# Patient Record
Sex: MALE | Race: WHITE | Employment: OTHER | URBAN - METROPOLITAN AREA
[De-identification: names, ages, dates, MRNs, and addresses within clinical notes are randomized per-mention and may not be internally consistent; named-entity substitution may affect disease eponyms.]

---

## 2017-03-15 ENCOUNTER — TELEPHONE (OUTPATIENT)
Dept: ORTHOPEDIC SURGERY | Age: 72
End: 2017-03-15

## 2017-03-15 ENCOUNTER — OFFICE VISIT (OUTPATIENT)
Dept: ORTHOPEDIC SURGERY | Age: 72
End: 2017-03-15
Payer: MEDICARE

## 2017-03-15 VITALS
HEART RATE: 99 BPM | WEIGHT: 200 LBS | SYSTOLIC BLOOD PRESSURE: 155 MMHG | BODY MASS INDEX: 28.63 KG/M2 | DIASTOLIC BLOOD PRESSURE: 107 MMHG | HEIGHT: 70 IN

## 2017-03-15 DIAGNOSIS — M16.11 ARTHRITIS OF RIGHT HIP: ICD-10-CM

## 2017-03-15 DIAGNOSIS — M25.551 RIGHT HIP PAIN: Primary | ICD-10-CM

## 2017-03-15 PROCEDURE — 3017F COLORECTAL CA SCREEN DOC REV: CPT | Performed by: ORTHOPAEDIC SURGERY

## 2017-03-15 PROCEDURE — G8427 DOCREV CUR MEDS BY ELIG CLIN: HCPCS | Performed by: ORTHOPAEDIC SURGERY

## 2017-03-15 PROCEDURE — 4004F PT TOBACCO SCREEN RCVD TLK: CPT | Performed by: ORTHOPAEDIC SURGERY

## 2017-03-15 PROCEDURE — G8484 FLU IMMUNIZE NO ADMIN: HCPCS | Performed by: ORTHOPAEDIC SURGERY

## 2017-03-15 PROCEDURE — 4040F PNEUMOC VAC/ADMIN/RCVD: CPT | Performed by: ORTHOPAEDIC SURGERY

## 2017-03-15 PROCEDURE — 1123F ACP DISCUSS/DSCN MKR DOCD: CPT | Performed by: ORTHOPAEDIC SURGERY

## 2017-03-15 PROCEDURE — G8420 CALC BMI NORM PARAMETERS: HCPCS | Performed by: ORTHOPAEDIC SURGERY

## 2017-03-15 PROCEDURE — 99213 OFFICE O/P EST LOW 20 MIN: CPT | Performed by: ORTHOPAEDIC SURGERY

## 2017-03-15 RX ORDER — MELOXICAM 15 MG/1
15 TABLET ORAL DAILY
Qty: 30 TABLET | Refills: 3 | Status: SHIPPED | OUTPATIENT
Start: 2017-03-15 | End: 2017-11-01 | Stop reason: ALTCHOICE

## 2017-04-13 ENCOUNTER — TELEPHONE (OUTPATIENT)
Dept: ORTHOPEDIC SURGERY | Age: 72
End: 2017-04-13

## 2017-05-04 ENCOUNTER — TELEPHONE (OUTPATIENT)
Dept: ORTHOPEDIC SURGERY | Age: 72
End: 2017-05-04

## 2017-05-12 ENCOUNTER — OFFICE VISIT (OUTPATIENT)
Dept: ORTHOPEDIC SURGERY | Age: 72
End: 2017-05-12
Payer: MEDICARE

## 2017-05-12 DIAGNOSIS — M25.551 RIGHT HIP PAIN: Primary | ICD-10-CM

## 2017-05-12 PROCEDURE — G8420 CALC BMI NORM PARAMETERS: HCPCS | Performed by: ORTHOPAEDIC SURGERY

## 2017-05-12 PROCEDURE — 1123F ACP DISCUSS/DSCN MKR DOCD: CPT | Performed by: ORTHOPAEDIC SURGERY

## 2017-05-12 PROCEDURE — 99213 OFFICE O/P EST LOW 20 MIN: CPT | Performed by: ORTHOPAEDIC SURGERY

## 2017-05-12 PROCEDURE — 3017F COLORECTAL CA SCREEN DOC REV: CPT | Performed by: ORTHOPAEDIC SURGERY

## 2017-05-12 PROCEDURE — G8427 DOCREV CUR MEDS BY ELIG CLIN: HCPCS | Performed by: ORTHOPAEDIC SURGERY

## 2017-05-12 PROCEDURE — 4004F PT TOBACCO SCREEN RCVD TLK: CPT | Performed by: ORTHOPAEDIC SURGERY

## 2017-05-12 PROCEDURE — 4040F PNEUMOC VAC/ADMIN/RCVD: CPT | Performed by: ORTHOPAEDIC SURGERY

## 2017-06-12 ENCOUNTER — OFFICE VISIT (OUTPATIENT)
Dept: ORTHOPEDIC SURGERY | Age: 72
End: 2017-06-12
Payer: MEDICARE

## 2017-06-12 DIAGNOSIS — M25.551 RIGHT HIP PAIN: Primary | ICD-10-CM

## 2017-06-12 PROCEDURE — G8427 DOCREV CUR MEDS BY ELIG CLIN: HCPCS | Performed by: ORTHOPAEDIC SURGERY

## 2017-06-12 PROCEDURE — G8419 CALC BMI OUT NRM PARAM NOF/U: HCPCS | Performed by: ORTHOPAEDIC SURGERY

## 2017-06-12 PROCEDURE — 3017F COLORECTAL CA SCREEN DOC REV: CPT | Performed by: ORTHOPAEDIC SURGERY

## 2017-06-12 PROCEDURE — 99212 OFFICE O/P EST SF 10 MIN: CPT | Performed by: ORTHOPAEDIC SURGERY

## 2017-06-12 PROCEDURE — 4040F PNEUMOC VAC/ADMIN/RCVD: CPT | Performed by: ORTHOPAEDIC SURGERY

## 2017-06-12 PROCEDURE — 1123F ACP DISCUSS/DSCN MKR DOCD: CPT | Performed by: ORTHOPAEDIC SURGERY

## 2017-06-12 PROCEDURE — 4004F PT TOBACCO SCREEN RCVD TLK: CPT | Performed by: ORTHOPAEDIC SURGERY

## 2017-09-13 ENCOUNTER — OFFICE VISIT (OUTPATIENT)
Dept: ORTHOPEDIC SURGERY | Age: 72
End: 2017-09-13
Payer: MEDICARE

## 2017-09-13 VITALS
SYSTOLIC BLOOD PRESSURE: 138 MMHG | BODY MASS INDEX: 27.49 KG/M2 | HEART RATE: 92 BPM | WEIGHT: 192 LBS | HEIGHT: 70 IN | DIASTOLIC BLOOD PRESSURE: 82 MMHG

## 2017-09-13 DIAGNOSIS — M25.551 RIGHT HIP PAIN: Primary | ICD-10-CM

## 2017-09-13 PROCEDURE — 4040F PNEUMOC VAC/ADMIN/RCVD: CPT | Performed by: ORTHOPAEDIC SURGERY

## 2017-09-13 PROCEDURE — 3017F COLORECTAL CA SCREEN DOC REV: CPT | Performed by: ORTHOPAEDIC SURGERY

## 2017-09-13 PROCEDURE — G8419 CALC BMI OUT NRM PARAM NOF/U: HCPCS | Performed by: ORTHOPAEDIC SURGERY

## 2017-09-13 PROCEDURE — 99213 OFFICE O/P EST LOW 20 MIN: CPT | Performed by: ORTHOPAEDIC SURGERY

## 2017-09-13 PROCEDURE — 4004F PT TOBACCO SCREEN RCVD TLK: CPT | Performed by: ORTHOPAEDIC SURGERY

## 2017-09-13 PROCEDURE — 1123F ACP DISCUSS/DSCN MKR DOCD: CPT | Performed by: ORTHOPAEDIC SURGERY

## 2017-09-13 PROCEDURE — G8427 DOCREV CUR MEDS BY ELIG CLIN: HCPCS | Performed by: ORTHOPAEDIC SURGERY

## 2017-09-13 RX ORDER — GLIMEPIRIDE 1 MG/1
1 TABLET ORAL
COMMUNITY

## 2017-09-21 RX ORDER — HYDROCODONE BITARTRATE AND ACETAMINOPHEN 5; 325 MG/1; MG/1
TABLET ORAL
Qty: 40 TABLET | Refills: 0 | Status: SHIPPED | OUTPATIENT
Start: 2017-09-21 | End: 2017-09-28 | Stop reason: SDUPTHER

## 2017-09-28 RX ORDER — HYDROCODONE BITARTRATE AND ACETAMINOPHEN 5; 325 MG/1; MG/1
TABLET ORAL
Qty: 40 TABLET | Refills: 0 | Status: SHIPPED | OUTPATIENT
Start: 2017-09-28 | End: 2017-10-06 | Stop reason: SDUPTHER

## 2017-10-06 DIAGNOSIS — M16.11 PRIMARY OSTEOARTHRITIS OF RIGHT HIP: Primary | ICD-10-CM

## 2017-10-06 RX ORDER — HYDROCODONE BITARTRATE AND ACETAMINOPHEN 5; 325 MG/1; MG/1
TABLET ORAL
Qty: 40 TABLET | Refills: 0 | Status: SHIPPED | OUTPATIENT
Start: 2017-10-06 | End: 2017-10-26 | Stop reason: SDUPTHER

## 2017-10-20 ENCOUNTER — TELEPHONE (OUTPATIENT)
Dept: ORTHOPEDIC SURGERY | Age: 72
End: 2017-10-20

## 2017-10-26 DIAGNOSIS — M16.11 PRIMARY OSTEOARTHRITIS OF RIGHT HIP: Primary | ICD-10-CM

## 2017-10-26 RX ORDER — HYDROCODONE BITARTRATE AND ACETAMINOPHEN 5; 325 MG/1; MG/1
TABLET ORAL
Qty: 40 TABLET | Refills: 0 | Status: SHIPPED | OUTPATIENT
Start: 2017-10-26 | End: 2017-12-13 | Stop reason: ALTCHOICE

## 2017-11-01 ENCOUNTER — OFFICE VISIT (OUTPATIENT)
Dept: ORTHOPEDIC SURGERY | Age: 72
End: 2017-11-01

## 2017-11-01 DIAGNOSIS — Z96.641 STATUS POST TOTAL REPLACEMENT OF RIGHT HIP: Primary | ICD-10-CM

## 2017-11-01 PROCEDURE — 99024 POSTOP FOLLOW-UP VISIT: CPT | Performed by: ORTHOPAEDIC SURGERY

## 2017-11-01 RX ORDER — ASPIRIN 325 MG
325 TABLET ORAL
COMMUNITY

## 2017-11-01 NOTE — PROGRESS NOTES
Ulises Harrell M.D.            [address] [place] [address]., [address] [facility], [address] [facility]             Dept Phone: [phone]             Dept Fax:  [phone]  Lorenso Crigler returns today status post right total hip ASI on 10/19/17. He states his hip feels great. Examination his right hip wound is pristine. No redness or drainage. 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 Leg lengths are equal.  No calf tenderness negative Homans. No pain or rotation of his hip    X-rays taken today reviewed and reviewed by me show AP the pelvis the lateral the right hip. Prosthesis looks excellent in both views. Previous left total hip arthroplasty noted    Impression  Status post right total hip ASI ×2 weeks doing great  Previous left total hip 8 years doing well    Plan  Patient is very happy with how it feels. He's familiar with exercises. He can do his own program as he does go to the gym. Given some precautions. Back here in 6 weeks'          Review of Systems   Constitutional: Negative. HENT: Negative. Respiratory: Negative. Cardiovascular: Negative. Musculoskeletal: Negative. Neurological: Negative.          Past Medical History:   Diagnosis Date    Cancer [facility])     prostate    Cataract     Diabetes ([facility]     Hypertension      Past Surgical History:   Procedure Laterality Date    BICEPS TENDON REPAIR Left 2011    CARPAL TUNNEL RELEASE      CATARACT REMOVAL WITH IMPLANT Left 1977    COLONOSCOPY  2010    EYE SURGERY      from accident    HAND SURGERY Left 2001    has 4 screws into wrist area-carpal tunnel done same time    JOINT REPLACEMENT Left 2009    total left hip    KNEE ARTHROSCOPY      PROSTATE SURGERY  2004    prostate removal due to cancer    SHOULDER ARTHROSCOPY       Family History   Problem Relation Age of Onset    Cancer Father     Arthritis Brother     Cancer Brother     Cancer Brother            Orders Placed This

## 2017-12-13 ENCOUNTER — OFFICE VISIT (OUTPATIENT)
Dept: ORTHOPEDIC SURGERY | Age: 72
End: 2017-12-13

## 2017-12-13 DIAGNOSIS — Z96.641 STATUS POST TOTAL REPLACEMENT OF RIGHT HIP: Primary | ICD-10-CM

## 2017-12-13 PROCEDURE — 99024 POSTOP FOLLOW-UP VISIT: CPT | Performed by: ORTHOPAEDIC SURGERY

## 2017-12-13 NOTE — PROGRESS NOTES
Kandy Carpenter M.D.            [facility] [address] [facility],[address], [address] [facility]             Dept Phone: [phone]             Dept Fax:  [phone]  Kadi Velasquez returns today he is 8 weeks status post right total hip ASI. Patient states he has basically very little pain. He states that the first few steps when he gets out of a chair or little bit rough but once he gets going he has no pain whatsoever. He's been doing his elliptical as well without any limitation. Her graft examination notes a mild meralgia paresthetica. His leg lengths are equal.  He can do single stance. Once he gets up and walking he has a normal gait. No new x-rays taken today    Impression  8 weeks status post right total hip ASI doing very well    Plan  Patient is doing his own exercises and doing very well. We'll see him back here in 4 months. Call any problems prior to that time          Review of Systems   Constitutional: Negative. HENT: Negative. Respiratory: Negative. Cardiovascular: Negative. Musculoskeletal: Negative. Neurological: Negative. Past Medical History:   Diagnosis Date    Cancer [facility])     prostate    Cataract     Diabetes ([facility] [address]     Hypertension      Past Surgical History:   Procedure Laterality Date    BICEPS TENDON REPAIR Left 2011    CARPAL TUNNEL RELEASE      CATARACT REMOVAL WITH IMPLANT Left 1977    COLONOSCOPY  2010    EYE SURGERY      from accident    HAND SURGERY Left 2001    has 4 screws into wrist area-carpal tunnel done same time    JOINT REPLACEMENT Left 2009    total left hip    KNEE ARTHROSCOPY      PROSTATE SURGERY  2004    prostate removal due to cancer    SHOULDER ARTHROSCOPY       Family History   Problem Relation Age of Onset    Cancer Father     Arthritis Brother     Cancer Brother     Cancer Brother          No orders of the defined types were placed in this encounter.       1. Status post

## 2018-04-13 ENCOUNTER — OFFICE VISIT (OUTPATIENT)
Dept: ORTHOPEDIC SURGERY | Age: 73
End: 2018-04-13
Payer: MEDICARE

## 2018-04-13 VITALS — BODY MASS INDEX: 29.78 KG/M2 | WEIGHT: 208 LBS | HEART RATE: 68 BPM | RESPIRATION RATE: 18 BRPM | HEIGHT: 70 IN

## 2018-04-13 DIAGNOSIS — Z96.641 STATUS POST TOTAL REPLACEMENT OF RIGHT HIP: Primary | ICD-10-CM

## 2018-04-13 PROCEDURE — 3017F COLORECTAL CA SCREEN DOC REV: CPT | Performed by: ORTHOPAEDIC SURGERY

## 2018-04-13 PROCEDURE — 1123F ACP DISCUSS/DSCN MKR DOCD: CPT | Performed by: ORTHOPAEDIC SURGERY

## 2018-04-13 PROCEDURE — 4040F PNEUMOC VAC/ADMIN/RCVD: CPT | Performed by: ORTHOPAEDIC SURGERY

## 2018-04-13 PROCEDURE — 4004F PT TOBACCO SCREEN RCVD TLK: CPT | Performed by: ORTHOPAEDIC SURGERY

## 2018-04-13 PROCEDURE — 99213 OFFICE O/P EST LOW 20 MIN: CPT | Performed by: ORTHOPAEDIC SURGERY

## 2018-04-13 PROCEDURE — G8427 DOCREV CUR MEDS BY ELIG CLIN: HCPCS | Performed by: ORTHOPAEDIC SURGERY

## 2018-04-13 PROCEDURE — G8419 CALC BMI OUT NRM PARAM NOF/U: HCPCS | Performed by: ORTHOPAEDIC SURGERY

## 2018-10-31 ENCOUNTER — OFFICE VISIT (OUTPATIENT)
Dept: ORTHOPEDIC SURGERY | Age: 73
End: 2018-10-31
Payer: MEDICARE

## 2018-10-31 DIAGNOSIS — Z96.641 H/O TOTAL HIP ARTHROPLASTY, RIGHT: Primary | ICD-10-CM

## 2018-10-31 PROCEDURE — 99213 OFFICE O/P EST LOW 20 MIN: CPT | Performed by: ORTHOPAEDIC SURGERY

## 2018-10-31 PROCEDURE — 3017F COLORECTAL CA SCREEN DOC REV: CPT | Performed by: ORTHOPAEDIC SURGERY

## 2018-10-31 PROCEDURE — 1123F ACP DISCUSS/DSCN MKR DOCD: CPT | Performed by: ORTHOPAEDIC SURGERY

## 2018-10-31 PROCEDURE — 1101F PT FALLS ASSESS-DOCD LE1/YR: CPT | Performed by: ORTHOPAEDIC SURGERY

## 2018-10-31 PROCEDURE — G8419 CALC BMI OUT NRM PARAM NOF/U: HCPCS | Performed by: ORTHOPAEDIC SURGERY

## 2018-10-31 PROCEDURE — G8427 DOCREV CUR MEDS BY ELIG CLIN: HCPCS | Performed by: ORTHOPAEDIC SURGERY

## 2018-10-31 PROCEDURE — 4040F PNEUMOC VAC/ADMIN/RCVD: CPT | Performed by: ORTHOPAEDIC SURGERY

## 2018-10-31 PROCEDURE — G8484 FLU IMMUNIZE NO ADMIN: HCPCS | Performed by: ORTHOPAEDIC SURGERY

## 2018-10-31 PROCEDURE — 4004F PT TOBACCO SCREEN RCVD TLK: CPT | Performed by: ORTHOPAEDIC SURGERY

## 2020-01-22 ENCOUNTER — OFFICE VISIT (OUTPATIENT)
Dept: ORTHOPEDIC SURGERY | Age: 75
End: 2020-01-22
Payer: MEDICARE

## 2020-01-22 VITALS — HEIGHT: 70 IN | BODY MASS INDEX: 28.77 KG/M2 | WEIGHT: 201 LBS

## 2020-01-22 PROCEDURE — G8484 FLU IMMUNIZE NO ADMIN: HCPCS | Performed by: ORTHOPAEDIC SURGERY

## 2020-01-22 PROCEDURE — 3017F COLORECTAL CA SCREEN DOC REV: CPT | Performed by: ORTHOPAEDIC SURGERY

## 2020-01-22 PROCEDURE — G8427 DOCREV CUR MEDS BY ELIG CLIN: HCPCS | Performed by: ORTHOPAEDIC SURGERY

## 2020-01-22 PROCEDURE — G8417 CALC BMI ABV UP PARAM F/U: HCPCS | Performed by: ORTHOPAEDIC SURGERY

## 2020-01-22 PROCEDURE — 4040F PNEUMOC VAC/ADMIN/RCVD: CPT | Performed by: ORTHOPAEDIC SURGERY

## 2020-01-22 PROCEDURE — 99213 OFFICE O/P EST LOW 20 MIN: CPT | Performed by: ORTHOPAEDIC SURGERY

## 2020-01-22 PROCEDURE — 1123F ACP DISCUSS/DSCN MKR DOCD: CPT | Performed by: ORTHOPAEDIC SURGERY

## 2020-01-22 PROCEDURE — 4004F PT TOBACCO SCREEN RCVD TLK: CPT | Performed by: ORTHOPAEDIC SURGERY

## 2020-01-22 NOTE — PROGRESS NOTES
Evelyn Guevara M.D.            [address] [place] [address]., [address] [facility], [address] [facility]           Dept Phone: [phone]           Dept Fax:  [phone], [place]          Dept Phone: [phone]           Dept Fax:  [phone]      Chief Compliant:  Chief Complaint   Patient presents with    Hip Pain     Left         History of Present Illness:  Satira Grief returns today. This is a 69 y.o. male who presents to the clinic today for left hip pain. Patient is status post right BENITO times two years and left BENITO approximately 11 years ago. We are keeping watch on hit left BENITO due to metal components. He says he was doing great until 1/20/20. He went to get up and has pain to his left hip that  Went down the back of his leg to his calf. He states today his has improved significantly. Denies history of DVTs. Review of Systems   Constitutional: Negative for fever, chills, sweats, recent illness, or recent injury. Neurological: Negative for headaches, numbness, or weakness. Integumentary: Negative for rash, itching, ecchymosis, abrasions, or laceration. Musculoskeletal: Positive for Hip Pain (Left )       Physical Exam:  Constitutional: Patient is oriented to person, place, and time. Patient appears well-developed and well nourished. HENT: Negative otherwise noted  Head: Normocephalic and Atraumatic  Nose: Normal  Eyes: Conjunctivae and EOM are normal  Neck: Normal range of motion Neck supple. Respiratory/Cardio: Effort normal. No respiratory distress. Musculoskeletal:  Left hip exam: Normal rotation of his left hip. Negative calf tenderness, negative nima's sign. Neurological: Patient is alert and oriented to person, place, and time. Normal strenght. No sensory deficit.   Skin: Skin is warm and dry  Psychiatric: Behavior is normal. Thought content normal.  Nursing note and vitals reviewed. Labs and Imaging:     XR taken today:  Xr Pelvis (1-2 Views)    Result Date: 1/22/2020  X-rays taken today reviewed by me show standing AP of the pelvis. Patient is status post bilateral total hips. His left total hip is a metal-on-metal prosthesis. I see no undue effect secondary to any adverse local tissue reaction. Patient's right total hip is in excellent position as well. There is a polyethylene/cobalt chrome head prosthesis    Assessment and Plan:  1. History of total hip arthroplasty, bilateral    2. Bilateral hip joint arthritis        This is a 69 y.o. male who presents to the clinic today for follow up left hip pain. Patient had a flare up a couple days ago that sent shooting pain down the back of his left leg to his calf. He is doing better today and has normal rotation of his left hip, believe that it was just a slight pinch in his back that is resolved at this time. Addressed to follow up in 2 years. Call with new or worsening symptoms. Past History:    Current Outpatient Medications:     aspirin 325 MG tablet, Take 325 mg by mouth Twice a Week, Disp: , Rfl:     glimepiride (AMARYL) 1 MG tablet, Take 1 mg by mouth every morning (before breakfast) Using 1.5 tabs per day, Disp: , Rfl:     lisinopril-hydrochlorothiazide (PRINZIDE;ZESTORETIC) 10-12.5 MG per tablet, TAKE 1 TABLET DAILY, Disp: 90 tablet, Rfl: 3    STENDRA 200 MG TABS,  Take 1 tablet by mouth as needed , Disp: , Rfl: 0    therapeutic multivitamin-minerals (THERAGRAN-M) tablet, Take 1 tablet by mouth daily. , Disp: , Rfl:   Allergies   Allergen Reactions    Amoxil [Amoxicillin] Itching and Swelling     Social History     Socioeconomic History    Marital status:      Spouse name: Not on file    Number of children: Not on file    Years of education: Not on file    Highest education level: Not on file   Occupational History    Not on file   Social Needs    this documentation has been prepared under the direction and in the presence of Dr. Kelsy Sylvester. Electronically signed: Yuni Garcia, 1/22/20     Please note that this chart was generated using voice recognition Dragon dictation software. Although every effort was made to ensure the accuracy of this automated transcription, some errors in transcription may have occurred.

## 2022-10-05 ENCOUNTER — OFFICE VISIT (OUTPATIENT)
Dept: ORTHOPEDIC SURGERY | Age: 77
End: 2022-10-05
Payer: MEDICARE

## 2022-10-05 DIAGNOSIS — M54.42 ACUTE LOW BACK PAIN WITH BILATERAL SCIATICA, UNSPECIFIED BACK PAIN LATERALITY: Primary | ICD-10-CM

## 2022-10-05 DIAGNOSIS — Z96.643 H/O TOTAL HIP ARTHROPLASTY, BILATERAL: ICD-10-CM

## 2022-10-05 DIAGNOSIS — M54.41 ACUTE LOW BACK PAIN WITH BILATERAL SCIATICA, UNSPECIFIED BACK PAIN LATERALITY: Primary | ICD-10-CM

## 2022-10-05 PROCEDURE — 4004F PT TOBACCO SCREEN RCVD TLK: CPT | Performed by: ORTHOPAEDIC SURGERY

## 2022-10-05 PROCEDURE — G8484 FLU IMMUNIZE NO ADMIN: HCPCS | Performed by: ORTHOPAEDIC SURGERY

## 2022-10-05 PROCEDURE — 1123F ACP DISCUSS/DSCN MKR DOCD: CPT | Performed by: ORTHOPAEDIC SURGERY

## 2022-10-05 PROCEDURE — G8421 BMI NOT CALCULATED: HCPCS | Performed by: ORTHOPAEDIC SURGERY

## 2022-10-05 PROCEDURE — G8428 CUR MEDS NOT DOCUMENT: HCPCS | Performed by: ORTHOPAEDIC SURGERY

## 2022-10-05 PROCEDURE — 99213 OFFICE O/P EST LOW 20 MIN: CPT | Performed by: ORTHOPAEDIC SURGERY

## 2022-10-05 RX ORDER — METHYLPREDNISOLONE 4 MG/1
TABLET ORAL
Qty: 1 KIT | Refills: 0 | Status: SHIPPED | OUTPATIENT
Start: 2022-10-05 | End: 2022-10-11

## 2022-10-05 NOTE — PROGRESS NOTES
Yeyo Posey M.D.            [facility] [address] [facility],[address], [address] [facility]           Dept Phone: [phone]           Dept Fax:  [phone]           [place]          Dept Phone: [phone]           Dept Fax:  [phone]      Chief Compliant:  Chief Complaint   Patient presents with    Pain     Both ASI Yearly        History of Present Illness: This is a 69 y.o. male who presents to the clinic today for evaluation / follow up of status post bilateral total hips. The left hip was in 2010 the right hip and was in 2017. Patient's only complaint is that he feels he is got something in his buttock and goes down the back of his leg and into below his knee. Otherwise he has no groin pain and is still ambulating well. .       Review of Systems   Constitutional: Negative for fever, chills, sweats. Eyes: Negative for changes in vision, or pain. HENT: Negative for ear ache, epistaxis, or sore throat. Respiratory/Cardio: Negative for Chest pain, palpitations, SOB, or cough. Gastrointestinal: Negative for abdominal pain, N/V/D. Genitourinary: Negative for dysuria, frequency, urgency, or hematuria. Neurological: Negative for headache, numbness, or weakness. Integumentary: Negative for rash, itching, laceration, or abrasion. Musculoskeletal: Positive for Pain (Both ASI Yearly)       Physical Exam:  Constitutional: Patient is oriented to person, place, and time. Patient appears well-developed and well nourished. HENT: Negative otherwise noted  Head: Normocephalic and Atraumatic  Nose: Normal  Eyes: Conjunctivae and EOM are normal  Neck: Normal range of motion Neck supple. Respiratory/Cardio: Effort normal. No respiratory distress.   Musculoskeletal: Examination of patient's right hip notes pain-free range of motion flexion greater 90 degrees and rotation of 25 external Tatian 40 without any discomfort    Patient has no discomfort whatsoever motion his left hip. He has no pain on flexion internal ex rotation at all. No palpable masses are noted he has no palpable mass in his buttock he does have some tenderness in the sciatic notch and this does go down to his back of his knee indicative of some sciatica. Neurological: Patient is alert and oriented to person, place, and time. Normal strength. No sensory deficit. Skin: Skin is warm and dry  Psychiatric: Behavior is normal. Thought content normal.  Nursing note and vitals reviewed. Labs and Imaging:     XR taken today:  XR PELVIS (1-2 VIEWS)    Result Date: 10/5/2022  X-rays taken today reviewed by me show standing AP of the pelvis. Patient is status post bilateral total hips. Patient's left hip was a metal metal prosthesis. There is no evidence for any junction of prosthetic osteolysis or lucency. No evidence for any change in position or alignment. Patient's right hip is a Taperloc short stem with polyethylene at articulation. Again no periprosthetic problems are noted with evidence of radiolucency or loosening. Orders Placed This Encounter   Procedures    XR PELVIS (1-2 VIEWS)     Standing Status:   Future     Number of Occurrences:   1     Standing Expiration Date:   10/3/2023    [facility] [place]     Referral Priority:   Routine     Referral Type:   Eval and Treat     Referral Reason:   Specialty Services Required     Requested Specialty:   Physical Therapist     Number of Visits Requested:   1       Assessment and Plan:  Status post left total hip 2010 with a metal-on-metal prosthesis no evidence for metallosis/adverse local tissue reaction  Status post right total hip 2017 doing well  Sciatica left leg        This is a 69 y.o. male who presents to the clinic today for evaluation / follow up of status post bilateral total hips.      Past History:    Current Outpatient Medications:     methylPREDNISolone (MEDROL DOSEPACK) 4 MG tablet, Take by mouth., Disp: 1 kit, Rfl: 0    aspirin 325 MG tablet, Take 325 mg by mouth Twice a Week, Disp: , Rfl:     glimepiride (AMARYL) 1 MG tablet, Take 1 mg by mouth every morning (before breakfast) Using 1.5 tabs per day, Disp: , Rfl:     lisinopril-hydrochlorothiazide (PRINZIDE;ZESTORETIC) 10-12.5 MG per tablet, TAKE 1 TABLET DAILY, Disp: 90 tablet, Rfl: 3    STENDRA 200 MG TABS,  Take 1 tablet by mouth as needed , Disp: , Rfl: 0    therapeutic multivitamin-minerals (THERAGRAN-M) tablet, Take 1 tablet by mouth daily. , Disp: , Rfl:   Allergies   Allergen Reactions    Amoxil [Amoxicillin] Itching and Swelling     Social History     Socioeconomic History    Marital status:      Spouse name: Not on file    Number of children: Not on file    Years of education: Not on file    Highest education level: Not on file   Occupational History    Not on file   Tobacco Use    Smoking status: Never    Smokeless tobacco: Current     Types: Snuff   Substance and Sexual Activity    Alcohol use: No    Drug use: No    Sexual activity: Not on file   Other Topics Concern    Not on file   Social History Narrative    Not on file     Social Determinants of Health     Financial Resource Strain: Not on file   Food Insecurity: Not on file   Transportation Needs: Not on file   Physical Activity: Not on file   Stress: Not on file   Social Connections: Not on file   Intimate Partner Violence: Not on file   Housing Stability: Not on file     Past Medical History:   Diagnosis Date    Cancer [facility])     prostate    Cataract     Diabetes ([facility] [address]     Hypertension      Past Surgical History:   Procedure Laterality Date    BICEPS TENDON REPAIR Left 2011    CARPAL TUNNEL RELEASE      CATARACT EXTRACTION W/  INTRAOCULAR LENS IMPLANT Left 1977    COLONOSCOPY  2010    EYE SURGERY      from accident    HAND SURGERY Left 2001    has 4 screws into wrist area-carpal tunnel done same time    JOINT REPLACEMENT Left 2009    total left hip    JOINT REPLACEMENT Right 10/19/2017    BENITO    KNEE ARTHROSCOPY      PROSTATE SURGERY  2004    prostate removal due to cancer    SHOULDER ARTHROSCOPY       Family History   Problem Relation Age of Onset    Cancer Father     Arthritis Brother     Cancer Brother     Cancer Brother    Plan  Patient to be started some physical therapy for his sciatica also Medrol Dosepak. I do not think there is any obvious evidence for any metallosis or any issues with regards to any mental mental disease on the left side. If symptoms persist or no relief then may consider getting metal levels. Otherwise patient return back on annual basis. Provider Attestation:  Joseph Bermeo, personally performed the services described in this documentation. All medical record entries made by the scribe were at my direction and in my presence. I have reviewed the chart and discharge instructions and agree that the records reflect my personal performance and is accurate and complete. Arlene Pagan MD. 10/05/22      Please note that this chart was generated using voice recognition Dragon dictation software. Although every effort was made to ensure the accuracy of this automated transcription, some errors in transcription may have occurred.

## 2023-10-04 ENCOUNTER — OFFICE VISIT (OUTPATIENT)
Dept: ORTHOPEDIC SURGERY | Age: 78
End: 2023-10-04

## 2023-10-04 DIAGNOSIS — Z96.643 H/O TOTAL HIP ARTHROPLASTY, BILATERAL: Primary | ICD-10-CM

## 2023-10-04 NOTE — PROGRESS NOTES
Taina Peraza M.D.            [address] [facility], [address] [facility], [address] [place] [address]           Dept Phone: [phone]           Dept Fax:  [address]          Dept Phone: [phone]           Dept Fax:  [phone]      Chief Compliant:  Chief Complaint   Patient presents with    Pain     S/p b/l BENITO         History of Present Illness: This is a 69 y.o. male who presents to the clinic today for evaluation / follow up of bilateral total hips the right being ASI on 10/19/2017 and the left hip being a metal metal longstem Mallery head in 2010. 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 He does long walks and never has any complaints. Review of Systems   Constitutional: Negative for fever, chills, sweats. Eyes: Negative for changes in vision, or pain. HENT: Negative for ear ache, epistaxis, or sore throat. Respiratory/Cardio: Negative for Chest pain, palpitations, SOB, or cough. Gastrointestinal: Negative for abdominal pain, N/V/D. Genitourinary: Negative for dysuria, frequency, urgency, or hematuria. Neurological: Negative for headache, numbness, or weakness. Integumentary: Negative for rash, itching, laceration, or abrasion. Musculoskeletal: Positive for Pain (S/p b/l BENITO )       Physical Exam:  Constitutional: Patient is oriented to person, place, and time. Patient appears well-developed and well nourished. HENT: Negative otherwise noted  Head: Normocephalic and Atraumatic  Nose: Normal  Eyes: Conjunctivae and EOM are normal  Neck: Normal range of motion Neck supple. Respiratory/Cardio: Effort normal. No respiratory distress.   Musculoskeletal: Examination notes active motion patient's both hips with